# Patient Record
Sex: FEMALE | Race: WHITE | Employment: FULL TIME | URBAN - METROPOLITAN AREA
[De-identification: names, ages, dates, MRNs, and addresses within clinical notes are randomized per-mention and may not be internally consistent; named-entity substitution may affect disease eponyms.]

---

## 2018-05-30 PROBLEM — R00.0 TACHYCARDIA: Status: ACTIVE | Noted: 2018-05-30

## 2018-05-30 PROBLEM — R06.09 DYSPNEA ON EXERTION: Status: ACTIVE | Noted: 2018-05-30

## 2018-05-30 PROBLEM — R07.2 PRECORDIAL PAIN: Status: ACTIVE | Noted: 2018-05-30

## 2018-05-30 PROBLEM — R06.00 DYSPNEA ON EXERTION: Status: ACTIVE | Noted: 2018-05-30

## 2018-06-13 PROCEDURE — 84480 ASSAY TRIIODOTHYRONINE (T3): CPT | Performed by: INTERNAL MEDICINE

## 2018-06-27 PROBLEM — I47.11 INAPPROPRIATE SINUS NODE TACHYCARDIA (HCC): Status: ACTIVE | Noted: 2018-06-27

## 2018-06-27 PROBLEM — R00.0 INAPPROPRIATE SINUS NODE TACHYCARDIA: Status: ACTIVE | Noted: 2018-06-27

## 2018-10-09 PROBLEM — N89.0 VAIN I (VAGINAL INTRAEPITHELIAL NEOPLASIA GRADE I): Status: ACTIVE | Noted: 2018-10-09

## 2021-10-12 ENCOUNTER — HOSPITAL ENCOUNTER (EMERGENCY)
Age: 31
Discharge: HOME | End: 2021-10-12
Attending: EMERGENCY MEDICINE
Payer: COMMERCIAL

## 2021-10-12 ENCOUNTER — APPOINTMENT (OUTPATIENT)
Dept: CT IMAGING | Age: 31
End: 2021-10-12
Attending: EMERGENCY MEDICINE
Payer: COMMERCIAL

## 2021-10-12 VITALS
HEART RATE: 76 BPM | HEIGHT: 66 IN | BODY MASS INDEX: 22.5 KG/M2 | OXYGEN SATURATION: 100 % | WEIGHT: 140 LBS | RESPIRATION RATE: 16 BRPM | TEMPERATURE: 98 F | DIASTOLIC BLOOD PRESSURE: 58 MMHG | SYSTOLIC BLOOD PRESSURE: 118 MMHG

## 2021-10-12 DIAGNOSIS — H53.9 VISUAL DISTURBANCE: Primary | ICD-10-CM

## 2021-10-12 DIAGNOSIS — R51.9 ACUTE NONINTRACTABLE HEADACHE, UNSPECIFIED HEADACHE TYPE: ICD-10-CM

## 2021-10-12 PROCEDURE — 99284 EMERGENCY DEPT VISIT MOD MDM: CPT

## 2021-10-12 PROCEDURE — 80053 COMPREHEN METABOLIC PANEL: CPT

## 2021-10-12 PROCEDURE — 80053 COMPREHEN METABOLIC PANEL: CPT | Performed by: EMERGENCY MEDICINE

## 2021-10-12 PROCEDURE — 85025 COMPLETE CBC W/AUTO DIFF WBC: CPT

## 2021-10-12 PROCEDURE — 70498 CT ANGIOGRAPHY NECK: CPT | Performed by: EMERGENCY MEDICINE

## 2021-10-12 PROCEDURE — 70496 CT ANGIOGRAPHY HEAD: CPT | Performed by: EMERGENCY MEDICINE

## 2021-10-12 PROCEDURE — 85025 COMPLETE CBC W/AUTO DIFF WBC: CPT | Performed by: EMERGENCY MEDICINE

## 2021-10-12 PROCEDURE — 36415 COLL VENOUS BLD VENIPUNCTURE: CPT

## 2021-10-12 NOTE — ED INITIAL ASSESSMENT (HPI)
At work, stood up, felt like passing out, not feeling right, vision was off, noticed \"[place] that was distorted\" on right eye, couldn't see man standing right next to her with right eye, couldn't see out of right eye for 2-3 minutes, b/p was 164/95, now

## 2021-10-12 NOTE — ED PROVIDER NOTES
Patient Seen in: THE [facility] Emergency Department In [place]      History   Patient presents with:  Dizziness  Eye Visual Problem    Stated Complaint: dizzy, lost vision R eye 1130 for 3 min, now just dizzy    Subjective:   HPI    25-year-old female presen Social History    Tobacco Use      Smoking status: Never Smoker      Smokeless tobacco: Never Used    Vaping Use      Vaping Use: Never used    Alcohol use: Yes      Comment: Soc    Drug use:  No             Review of Systems    Positive for stated co CBC W/ DIFFERENTIAL[[id number]]                              Final result                 Please view results for these tests on the individual orders.    RAINBOW DRAW LAVENDER   RAINBOW DRAW LIGHT GREEN   RAINBOW DRAW GOLD   CBC W/ DIFFERENT involving the supraclinoid segments of the internal carotid arteries, greater than expected for the patient's age. An  anterior communicating artery is seen. The branches of the anterior cerebral and middle cerebral arteries are unremarkable.   A small ri 10/12/2021 at 3:49 PM     Finalized by (CST): Rene Bailey MD on 10/12/2021 at 4:02 PM              MDM      69-year-old female presents after an episode of visual disturbance that occurred after standing up and was preceded by lightheadedness.   After

## 2021-10-13 ENCOUNTER — TELEPHONE (OUTPATIENT)
Dept: NEUROLOGY | Facility: CLINIC | Age: 31
End: 2021-10-13

## 2021-10-13 NOTE — TELEPHONE ENCOUNTER
TIA CLINIC SCREENING    1. Date of ED visit/TIA diagnosis:  10/12/2021   Time of discharge from ED:  1652    2. Is patient currently admitted? No   If YES - [facility] Appointment not required.       3. Does patient already see an [facility] neurologist?  No  Nam

## 2021-10-15 ENCOUNTER — LAB ENCOUNTER (OUTPATIENT)
Dept: LAB | Age: 31
End: 2021-10-15
Attending: Other
Payer: COMMERCIAL

## 2021-10-15 ENCOUNTER — OFFICE VISIT (OUTPATIENT)
Dept: NEUROLOGY | Facility: CLINIC | Age: 31
End: 2021-10-15
Payer: COMMERCIAL

## 2021-10-15 VITALS
BODY MASS INDEX: 23 KG/M2 | RESPIRATION RATE: 16 BRPM | DIASTOLIC BLOOD PRESSURE: 68 MMHG | SYSTOLIC BLOOD PRESSURE: 112 MMHG | WEIGHT: 145 LBS | HEART RATE: 68 BPM

## 2021-10-15 DIAGNOSIS — G43.109 OCULAR MIGRAINE: ICD-10-CM

## 2021-10-15 DIAGNOSIS — R93.89 ABNORMAL COMPUTED TOMOGRAPHY ANGIOGRAPHY (CTA): ICD-10-CM

## 2021-10-15 DIAGNOSIS — H53.9 VISUAL DISTURBANCE: ICD-10-CM

## 2021-10-15 PROCEDURE — 86038 ANTINUCLEAR ANTIBODIES: CPT | Performed by: OTHER

## 2021-10-15 PROCEDURE — 3074F SYST BP LT 130 MM HG: CPT | Performed by: OTHER

## 2021-10-15 PROCEDURE — 3078F DIAST BP <80 MM HG: CPT | Performed by: OTHER

## 2021-10-15 PROCEDURE — 99244 OFF/OP CNSLTJ NEW/EST MOD 40: CPT | Performed by: OTHER

## 2021-10-15 PROCEDURE — 85652 RBC SED RATE AUTOMATED: CPT | Performed by: OTHER

## 2021-10-15 RX ORDER — IBUPROFEN 200 MG
200 TABLET ORAL AS NEEDED
COMMUNITY

## 2021-10-15 NOTE — PATIENT INSTRUCTIONS
MIGRAINE & DIET  TYPICAL Migraine FEATURES:  · Prodrome: A sign that a migraine in on the way in 10-30 minutes.  Signs include flashing lights, mental haziness, lack of concentration, irritability, excessive yawning, food craving, nausea, weird smel Suspect birth control (BCP), hormone treatments & mood changing pills. WARNING: Women taking BCPs who have migraine with aura (<1x/mo) have a stroke risk 2 times greater than women without migraine. Women with migraine > 1x/wk are at a 4x's greater risk. on an empty stomach: it will increase insulin release to decrease blood sugar & could cause HA.    · Possibly Copper is a factor: Acidic fruit / juices increase copper absorption: oranges, brielle, limes, grapes, raisins, grapefruit, plums, melons & uncooked Evaluate your life to pin-point stressors and ruthlessly eliminate the ones you can  · Stay HYDRATED: Drink 8oz of water with each meal or 64 oz water/day — it cushions the effect of triggers. Hot water is OK.    · Avoid Sudden Withdrawls from prolonged [place] you care, patients receiving routine medications need to be seen at least once a year. Patients receiving narcotic/controlled substance medications need to be seen at least once every 3 months.   · In the event that your preferred pharmacy does not have th insurance carrier gives the disclaimer that \"Although the procedure is authorized, this does not guarantee payment. \"    Ultimately the patient is responsible for payment. Thank you for your understanding in the matter.   Paperwork Completion:  • If you

## 2021-10-15 NOTE — PROGRESS NOTES
HPI:    Patient ID: Everette Aase is a 27year old female.   PCP: Dr Tanner Cheney  Referring provider: Dr Aliza Nguyen is a 43-year-old female with no significant past medical history who presented for evaluation of an episode of right visual dis pancreatic   • Heart Attack Paternal Grandfather    • High Blood Pressure Paternal Grandfather    • Breast Cancer Maternal Grandmother    • Thyroid Disorder Maternal Grandmother    • Osteoporosis Maternal Grandmother    • High Cholesterol Maternal G membrane  Neck: Normal range of motion. Neck supple. Cardiovascular: Normal rate, regular rhythm and normal heart sounds. Pulmonary/Chest: Effort normal and breath sounds normal.   Abdominal: Soft.  Bowel sounds are normal.     Neurological  Mental Stat Ophthalmology evaluation for retinal examination  Repeat MRA head to assess mild supraclinoid carotid calcified atherosclerosis reported on CTA, could be artifactual and unexpected for patient's age  Maintain a event/headache diary.  Counseled on triggers a

## 2021-10-15 NOTE — PROGRESS NOTES
Patient was in the ED on 10/12/2021. Patient states headaches on and off in the occipital and frontal region. Patient states difficulty with vision focusing. Patient states increase in dizziness. Denies changes in speech or memory. Denies balance changes.

## 2021-10-18 ENCOUNTER — ORDER TRANSCRIPTION (OUTPATIENT)
Dept: ADMINISTRATIVE | Facility: HOSPITAL | Age: 31
End: 2021-10-18

## 2021-10-18 DIAGNOSIS — Z01.812 ENCOUNTER FOR PREPROCEDURE SCREENING LABORATORY TESTING FOR COVID-19: Primary | ICD-10-CM

## 2021-10-18 DIAGNOSIS — Z20.822 ENCOUNTER FOR PREPROCEDURE SCREENING LABORATORY TESTING FOR COVID-19: Primary | ICD-10-CM

## 2021-10-26 RX ORDER — SODIUM CHLORIDE 9 MG/ML
INJECTION, SOLUTION INTRAVENOUS CONTINUOUS
Status: CANCELLED | OUTPATIENT
Start: 2021-10-26

## 2021-10-30 ENCOUNTER — LAB ENCOUNTER (OUTPATIENT)
Dept: LAB | Age: 31
End: 2021-10-30
Attending: Other
Payer: COMMERCIAL

## 2021-10-30 DIAGNOSIS — Z20.822 ENCOUNTER FOR PREPROCEDURE SCREENING LABORATORY TESTING FOR COVID-19: ICD-10-CM

## 2021-10-30 DIAGNOSIS — Z01.812 ENCOUNTER FOR PREPROCEDURE SCREENING LABORATORY TESTING FOR COVID-19: ICD-10-CM

## 2021-11-02 ENCOUNTER — ANESTHESIA (OUTPATIENT)
Dept: MRI IMAGING | Facility: HOSPITAL | Age: 31
End: 2021-11-02
Payer: COMMERCIAL

## 2021-11-02 ENCOUNTER — ANESTHESIA EVENT (OUTPATIENT)
Dept: MRI IMAGING | Facility: HOSPITAL | Age: 31
End: 2021-11-02
Payer: COMMERCIAL

## 2021-11-02 ENCOUNTER — HOSPITAL ENCOUNTER (OUTPATIENT)
Dept: MRI IMAGING | Facility: HOSPITAL | Age: 31
Discharge: HOME | End: 2021-11-02
Attending: Other
Payer: COMMERCIAL

## 2021-11-02 VITALS
BODY MASS INDEX: 23.3 KG/M2 | HEIGHT: 66 IN | RESPIRATION RATE: 18 BRPM | OXYGEN SATURATION: 98 % | HEART RATE: 73 BPM | SYSTOLIC BLOOD PRESSURE: 115 MMHG | DIASTOLIC BLOOD PRESSURE: 70 MMHG | WEIGHT: 145 LBS | TEMPERATURE: 98 F

## 2021-11-02 DIAGNOSIS — R93.89 ABNORMAL COMPUTED TOMOGRAPHY ANGIOGRAPHY (CTA): ICD-10-CM

## 2021-11-02 PROCEDURE — A9575 INJ GADOTERATE MEGLUMI 0.1ML: HCPCS | Performed by: OTHER

## 2021-11-02 PROCEDURE — 70546 MR ANGIOGRAPH HEAD W/O&W/DYE: CPT | Performed by: OTHER

## 2021-11-02 RX ORDER — ONDANSETRON 2 MG/ML
4 INJECTION INTRAMUSCULAR; INTRAVENOUS AS NEEDED
Status: CANCELLED | OUTPATIENT
Start: 2021-11-02 | End: 2021-11-02

## 2021-11-02 RX ORDER — HYDROCODONE BITARTRATE AND ACETAMINOPHEN 5; 325 MG/1; MG/1
2 TABLET ORAL AS NEEDED
Status: CANCELLED | OUTPATIENT
Start: 2021-11-02

## 2021-11-02 RX ORDER — HYDROCODONE BITARTRATE AND ACETAMINOPHEN 5; 325 MG/1; MG/1
1 TABLET ORAL AS NEEDED
Status: CANCELLED | OUTPATIENT
Start: 2021-11-02

## 2021-11-02 RX ORDER — MIDAZOLAM HYDROCHLORIDE 1 MG/ML
INJECTION INTRAMUSCULAR; INTRAVENOUS AS NEEDED
Status: DISCONTINUED | OUTPATIENT
Start: 2021-11-02 | End: 2021-11-02 | Stop reason: SURG

## 2021-11-02 RX ORDER — KETAMINE HYDROCHLORIDE 50 MG/ML
INJECTION, SOLUTION, CONCENTRATE INTRAMUSCULAR; INTRAVENOUS AS NEEDED
Status: DISCONTINUED | OUTPATIENT
Start: 2021-11-02 | End: 2021-11-02 | Stop reason: SURG

## 2021-11-02 RX ORDER — SODIUM CHLORIDE 9 MG/ML
INJECTION, SOLUTION INTRAVENOUS CONTINUOUS PRN
Status: DISCONTINUED | OUTPATIENT
Start: 2021-11-02 | End: 2021-11-02 | Stop reason: SURG

## 2021-11-02 RX ORDER — NALOXONE HYDROCHLORIDE 0.4 MG/ML
80 INJECTION, SOLUTION INTRAMUSCULAR; INTRAVENOUS; SUBCUTANEOUS AS NEEDED
Status: CANCELLED | OUTPATIENT
Start: 2021-11-02 | End: 2021-11-02

## 2021-11-02 RX ORDER — SODIUM CHLORIDE, SODIUM LACTATE, POTASSIUM CHLORIDE, CALCIUM CHLORIDE 600; 310; 30; 20 MG/100ML; MG/100ML; MG/100ML; MG/100ML
INJECTION, SOLUTION INTRAVENOUS CONTINUOUS
Status: CANCELLED | OUTPATIENT
Start: 2021-11-02

## 2021-11-02 RX ADMIN — MIDAZOLAM HYDROCHLORIDE 2 MG: 1 INJECTION INTRAMUSCULAR; INTRAVENOUS at 14:32:00

## 2021-11-02 RX ADMIN — SODIUM CHLORIDE: 9 INJECTION, SOLUTION INTRAVENOUS at 14:30:00

## 2021-11-02 RX ADMIN — KETAMINE HYDROCHLORIDE 25 MG: 50 INJECTION, SOLUTION, CONCENTRATE INTRAMUSCULAR; INTRAVENOUS at 14:32:00

## 2021-11-02 NOTE — ANESTHESIA POSTPROCEDURE EVALUATION
Antonia Lindsey 89 Patient Status:  Outpatient   Age/Gender 27year old female MRN [id number]   [facility] MRI Attending Allie Dsouza MD   Hosp Day # 0 [facility] 40 [facility]       Anesthesia Post-op Note        Procedure S

## 2021-11-02 NOTE — ANESTHESIA PREPROCEDURE EVALUATION
PRE-OP EVALUATION    Patient Name: Marin Kirby    Admit Diagnosis: Abnormal computed tomography angiography (CTA) [R93.89]    Pre-op Diagnosis: * No surgery found *        Anesthesia Procedure: MRA, HEAD (W+WO) (CPT=70546)    * Surgery not found * 10/12/2021    CREATSERUM 0.67 10/12/2021    GLU 80 10/12/2021    CA 9.1 10/12/2021            Airway      Mallampati: II  Mouth opening: >3 FB  TM distance: > 6 cm  Neck ROM: full Cardiovascular    Cardiovascular exam normal.         Dental    No notable d

## 2023-07-27 ENCOUNTER — APPOINTMENT (OUTPATIENT)
Dept: URBAN - METROPOLITAN AREA CLINIC 247 | Age: 33
Setting detail: DERMATOLOGY
End: 2023-07-27

## 2023-07-27 DIAGNOSIS — D18.0 HEMANGIOMA: ICD-10-CM

## 2023-07-27 DIAGNOSIS — L70.0 ACNE VULGARIS: ICD-10-CM

## 2023-07-27 DIAGNOSIS — L81.4 OTHER MELANIN HYPERPIGMENTATION: ICD-10-CM

## 2023-07-27 DIAGNOSIS — D22 MELANOCYTIC NEVI: ICD-10-CM

## 2023-07-27 DIAGNOSIS — L72.0 EPIDERMAL CYST: ICD-10-CM

## 2023-07-27 PROBLEM — D18.01 HEMANGIOMA OF SKIN AND SUBCUTANEOUS TISSUE: Status: ACTIVE | Noted: 2023-07-27

## 2023-07-27 PROBLEM — D22.5 MELANOCYTIC NEVI OF TRUNK: Status: ACTIVE | Noted: 2023-07-27

## 2023-07-27 PROCEDURE — OTHER PRESCRIPTION MEDICATION MANAGEMENT: OTHER

## 2023-07-27 PROCEDURE — OTHER PRESCRIPTION: OTHER

## 2023-07-27 PROCEDURE — OTHER COUNSELING: OTHER

## 2023-07-27 PROCEDURE — 99204 OFFICE O/P NEW MOD 45 MIN: CPT

## 2023-07-27 PROCEDURE — OTHER MIPS QUALITY: OTHER

## 2023-07-27 RX ORDER — TRETIONIN 0.25 MG/G
CREAM TOPICAL QHS
Qty: 20 | Refills: 5 | Status: ERX | COMMUNITY
Start: 2023-07-27

## 2023-07-27 ASSESSMENT — LOCATION ZONE DERM
LOCATION ZONE: TRUNK
LOCATION ZONE: FACE

## 2023-07-27 ASSESSMENT — LOCATION SIMPLE DESCRIPTION DERM
LOCATION SIMPLE: LEFT CHEEK
LOCATION SIMPLE: LEFT UPPER BACK
LOCATION SIMPLE: LEFT FOREHEAD
LOCATION SIMPLE: RIGHT UPPER BACK
LOCATION SIMPLE: ABDOMEN

## 2023-07-27 ASSESSMENT — LOCATION DETAILED DESCRIPTION DERM
LOCATION DETAILED: LEFT MEDIAL FOREHEAD
LOCATION DETAILED: PERIUMBILICAL SKIN
LOCATION DETAILED: RIGHT INFERIOR UPPER BACK
LOCATION DETAILED: RIGHT MID-UPPER BACK
LOCATION DETAILED: LEFT INFERIOR CENTRAL MALAR CHEEK
LOCATION DETAILED: LEFT SUPERIOR UPPER BACK

## 2023-07-27 NOTE — PROCEDURE: COUNSELING
Birth Control Pills Counseling: Birth Control Pill Counseling: I discussed with the patient the potential side effects of OCPs including but not limited to increased risk of stroke, heart attack, thrombophlebitis, deep venous thrombosis, hepatic adenomas, breast changes, GI upset, headaches, and depression.  The patient verbalized understanding of the proper use and possible adverse effects of OCPs. All of the patient's questions and concerns were addressed.
Erythromycin Pregnancy And Lactation Text: This medication is Pregnancy Category B and is considered safe during pregnancy. It is also excreted in breast milk.
Topical Retinoid Pregnancy And Lactation Text: This medication is Pregnancy Category C. It is unknown if this medication is excreted in breast milk.
Include Pregnancy/Lactation Warning?: No
Azelaic Acid Pregnancy And Lactation Text: This medication is considered safe during pregnancy and breast feeding.
Bactrim Counseling:  I discussed with the patient the risks of sulfa antibiotics including but not limited to GI upset, allergic reaction, drug rash, diarrhea, dizziness, photosensitivity, and yeast infections.  Rarely, more serious reactions can occur including but not limited to aplastic anemia, agranulocytosis, methemoglobinemia, blood dyscrasias, liver or kidney failure, lung infiltrates or desquamative/blistering drug rashes.
Sarecycline Counseling: Patient advised regarding possible photosensitivity and discoloration of the teeth, skin, lips, tongue and gums.  Patient instructed to avoid sunlight, if possible.  When exposed to sunlight, patients should wear protective clothing, sunglasses, and sunscreen.  The patient was instructed to call the office immediately if the following severe adverse effects occur:  hearing changes, easy bruising/bleeding, severe headache, or vision changes.  The patient verbalized understanding of the proper use and possible adverse effects of sarecycline.  All of the patient's questions and concerns were addressed.
Aklief Pregnancy And Lactation Text: It is unknown if this medication is safe to use during pregnancy.  It is unknown if this medication is excreted in breast milk.  Breastfeeding women should use the topical cream on the smallest area of the skin for the shortest time needed while breastfeeding.  Do not apply to nipple and areola.
Doxycycline Pregnancy And Lactation Text: This medication is Pregnancy Category D and not consider safe during pregnancy. It is also excreted in breast milk but is considered safe for shorter treatment courses.
Minocycline Counseling: Patient advised regarding possible photosensitivity and discoloration of the teeth, skin, lips, tongue and gums.  Patient instructed to avoid sunlight, if possible.  When exposed to sunlight, patients should wear protective clothing, sunglasses, and sunscreen.  The patient was instructed to call the office immediately if the following severe adverse effects occur:  hearing changes, easy bruising/bleeding, severe headache, or vision changes.  The patient verbalized understanding of the proper use and possible adverse effects of minocycline.  All of the patient's questions and concerns were addressed.
Tazorac Pregnancy And Lactation Text: This medication is not safe during pregnancy. It is unknown if this medication is excreted in breast milk.
Detail Level: Simple
Dapsone Pregnancy And Lactation Text: This medication is Pregnancy Category C and is not considered safe during pregnancy or breast feeding.
Azithromycin Counseling:  I discussed with the patient the risks of azithromycin including but not limited to GI upset, allergic reaction, drug rash, diarrhea, and yeast infections.
Topical Clindamycin Pregnancy And Lactation Text: This medication is Pregnancy Category B and is considered safe during pregnancy. It is unknown if it is excreted in breast milk.
Tetracycline Pregnancy And Lactation Text: This medication is Pregnancy Category D and not consider safe during pregnancy. It is also excreted in breast milk.
High Dose Vitamin A Counseling: Side effects reviewed, pt to contact office should one occur.
Topical Sulfur Applications Pregnancy And Lactation Text: This medication is Pregnancy Category C and has an unknown safety profile during pregnancy. It is unknown if this topical medication is excreted in breast milk.
Isotretinoin Counseling: Patient should get monthly blood tests, not donate blood, not drive at night if vision affected, not share medication, and not undergo elective surgery for 6 months after tx completed. Side effects reviewed, pt to contact office should one occur.
Spironolactone Pregnancy And Lactation Text: This medication can cause feminization of the male fetus and should be avoided during pregnancy. The active metabolite is also found in breast milk.
Birth Control Pills Pregnancy And Lactation Text: This medication should be avoided if pregnant and for the first 30 days post-partum.
Bactrim Pregnancy And Lactation Text: This medication is Pregnancy Category D and is known to cause fetal risk.  It is also excreted in breast milk.
Winlevi Pregnancy And Lactation Text: This medication is considered safe during pregnancy and breastfeeding.
Topical Retinoid counseling:  Patient advised to apply a pea-sized amount only at bedtime and wait 30 minutes after washing their face before applying.  If too drying, patient may add a non-comedogenic moisturizer. The patient verbalized understanding of the proper use and possible adverse effects of retinoids.  All of the patient's questions and concerns were addressed.
Benzoyl Peroxide Counseling: Patient counseled that medicine may cause skin irritation and bleach clothing.  In the event of skin irritation, the patient was advised to reduce the amount of the drug applied or use it less frequently.   The patient verbalized understanding of the proper use and possible adverse effects of benzoyl peroxide.  All of the patient's questions and concerns were addressed.
Erythromycin Counseling:  I discussed with the patient the risks of erythromycin including but not limited to GI upset, allergic reaction, drug rash, diarrhea, increase in liver enzymes, and yeast infections.
Tazorac Counseling:  Patient advised that medication is irritating and drying.  Patient may need to apply sparingly and wash off after an hour before eventually leaving it on overnight.  The patient verbalized understanding of the proper use and possible adverse effects of tazorac.  All of the patient's questions and concerns were addressed.
Azelaic Acid Counseling: Patient counseled that medicine may cause skin irritation and to avoid applying near the eyes.  In the event of skin irritation, the patient was advised to reduce the amount of the drug applied or use it less frequently.   The patient verbalized understanding of the proper use and possible adverse effects of azelaic acid.  All of the patient's questions and concerns were addressed.
Azithromycin Pregnancy And Lactation Text: This medication is considered safe during pregnancy and is also secreted in breast milk.
Aklief counseling:  Patient advised to apply a pea-sized amount only at bedtime and wait 30 minutes after washing their face before applying.  If too drying, patient may add a non-comedogenic moisturizer.  The most commonly reported side effects including irritation, redness, scaling, dryness, stinging, burning, itching, and increased risk of sunburn.  The patient verbalized understanding of the proper use and possible adverse effects of retinoids.  All of the patient's questions and concerns were addressed.
Topical Clindamycin Counseling: Patient counseled that this medication may cause skin irritation or allergic reactions.  In the event of skin irritation, the patient was advised to reduce the amount of the drug applied or use it less frequently.   The patient verbalized understanding of the proper use and possible adverse effects of clindamycin.  All of the patient's questions and concerns were addressed.
Doxycycline Counseling:  Patient counseled regarding possible photosensitivity and increased risk for sunburn.  Patient instructed to avoid sunlight, if possible.  When exposed to sunlight, patients should wear protective clothing, sunglasses, and sunscreen.  The patient was instructed to call the office immediately if the following severe adverse effects occur:  hearing changes, easy bruising/bleeding, severe headache, or vision changes.  The patient verbalized understanding of the proper use and possible adverse effects of doxycycline.  All of the patient's questions and concerns were addressed.
High Dose Vitamin A Pregnancy And Lactation Text: High dose vitamin A therapy is contraindicated during pregnancy and breast feeding.
Dapsone Counseling: I discussed with the patient the risks of dapsone including but not limited to hemolytic anemia, agranulocytosis, rashes, methemoglobinemia, kidney failure, peripheral neuropathy, headaches, GI upset, and liver toxicity.  Patients who start dapsone require monitoring including baseline LFTs and weekly CBCs for the first month, then every month thereafter.  The patient verbalized understanding of the proper use and possible adverse effects of dapsone.  All of the patient's questions and concerns were addressed.
Topical Sulfur Applications Counseling: Topical Sulfur Counseling: Patient counseled that this medication may cause skin irritation or allergic reactions.  In the event of skin irritation, the patient was advised to reduce the amount of the drug applied or use it less frequently.   The patient verbalized understanding of the proper use and possible adverse effects of topical sulfur application.  All of the patient's questions and concerns were addressed.
Tetracycline Counseling: Patient counseled regarding possible photosensitivity and increased risk for sunburn.  Patient instructed to avoid sunlight, if possible.  When exposed to sunlight, patients should wear protective clothing, sunglasses, and sunscreen.  The patient was instructed to call the office immediately if the following severe adverse effects occur:  hearing changes, easy bruising/bleeding, severe headache, or vision changes.  The patient verbalized understanding of the proper use and possible adverse effects of tetracycline.  All of the patient's questions and concerns were addressed. Patient understands to avoid pregnancy while on therapy due to potential birth defects.
Spironolactone Counseling: Patient advised regarding risks of diarrhea, abdominal pain, hyperkalemia, birth defects (for female patients), liver toxicity and renal toxicity. The patient may need blood work to monitor liver and kidney function and potassium levels while on therapy. The patient verbalized understanding of the proper use and possible adverse effects of spironolactone.  All of the patient's questions and concerns were addressed.
Benzoyl Peroxide Pregnancy And Lactation Text: This medication is Pregnancy Category C. It is unknown if benzoyl peroxide is excreted in breast milk.
Isotretinoin Pregnancy And Lactation Text: This medication is Pregnancy Category X and is considered extremely dangerous during pregnancy. It is unknown if it is excreted in breast milk.
Winlevi Counseling:  I discussed with the patient the risks of topical clascoterone including but not limited to erythema, scaling, itching, and stinging. Patient voiced their understanding.
Detail Level: Zone

## 2025-01-16 ENCOUNTER — OFFICE VISIT (OUTPATIENT)
Facility: CLINIC | Age: 35
End: 2025-01-16
Payer: COMMERCIAL

## 2025-01-16 VITALS
WEIGHT: 151 LBS | BODY MASS INDEX: 24.27 KG/M2 | SYSTOLIC BLOOD PRESSURE: 122 MMHG | HEIGHT: 66 IN | DIASTOLIC BLOOD PRESSURE: 92 MMHG

## 2025-01-16 DIAGNOSIS — Z31.69 ENCOUNTER FOR PRECONCEPTION CONSULTATION: Primary | ICD-10-CM

## 2025-01-16 PROCEDURE — 3080F DIAST BP >= 90 MM HG: CPT | Performed by: OBSTETRICS & GYNECOLOGY

## 2025-01-16 PROCEDURE — 3074F SYST BP LT 130 MM HG: CPT | Performed by: OBSTETRICS & GYNECOLOGY

## 2025-01-16 PROCEDURE — 99202 OFFICE O/P NEW SF 15 MIN: CPT | Performed by: OBSTETRICS & GYNECOLOGY

## 2025-01-16 PROCEDURE — 3008F BODY MASS INDEX DOCD: CPT | Performed by: OBSTETRICS & GYNECOLOGY

## 2025-01-16 RX ORDER — MULTIVIT,IRON,MINERALS/LUTEIN
TABLET ORAL
COMMUNITY

## 2025-01-16 NOTE — PROGRESS NOTES
GYN H&P     Genetic questionnaire reviewed with the patient and she will be referred for genetic counseling if the questionnaire had any positive results.    The [facility] intake form was also reviewed regarding contraception, menstrual periods, urinary health, and vaginal / sexual health    2025  4:58 PM    Chief Complaint   Patient presents with    Pregnancy     Patient is trying to conceive , has been unsuccessful        HPI: Mabel is a 34 year old  Patient's last menstrual period was 2024 (approximate).  (contraception: trying to conceive ) here for her annual gyn exam.     She has no complaints.   Menses are regular. Denies any pelvic or breast complaints.   Satisfied with current contraception.         Previous encounters and chart reviewed.     OB History    Para Term  AB Living   0 0 0 0 0 0   SAB IAB Ectopic Multiple Live Births   0 0 0 0         GYN hx:   Menarche: 17  Period Cycle (Days): 25-29  Period Duration (Days): 5-7  Use of Birth Control (if yes, specify type): None  Pap Date: 23  Pap Result Notes: 2023 neg/neg  Follow Up Recommendation: due       Past Medical History:    Arrhythmia    tachycardia    Pap smear abnormality of cervix/human papillomavirus (HPV) positive    Sinus tachycardia    Visual impairment    contacts     Past Surgical History:   Procedure Laterality Date    Breast biopsy  3/31/16    benign    Colposcopy,bx cervix/endocerv curr  2018    Colposcopy,bx cervix/endocerv curr  2019    NL     Allergies[1]  Medications Ordered Prior to Encounter[2]  Family History   Problem Relation Age of Onset    Prostate Cancer Father     Cancer Paternal Grandfather         pancreatic    Heart Attack Paternal Grandfather     High Blood Pressure Paternal Grandfather     Breast Cancer Maternal Grandmother     Thyroid Disorder Maternal Grandmother     Osteoporosis Maternal Grandmother     High Cholesterol Maternal Grandmother      Heart Disorder Maternal Grandfather     Heart Attack Maternal Grandfather     High Cholesterol Maternal Grandfather     High Cholesterol Paternal Grandmother      Social History     Socioeconomic History    Marital status:      Spouse name: Not on file    Number of children: Not on file    Years of education: Not on file    Highest education level: Not on file   Occupational History    Not on file   Tobacco Use    Smoking status: Never    Smokeless tobacco: Never   Vaping Use    Vaping status: Never Used   Substance and Sexual Activity    Alcohol use: Yes     Comment: Soc    Drug use: No    Sexual activity: Yes     Partners: Male   Other Topics Concern     Service Not Asked    Blood Transfusions Not Asked    Caffeine Concern Yes     Comment: coffee daily    Occupational Exposure Not Asked    Hobby Hazards Not Asked    Sleep Concern Not Asked    Stress Concern Not Asked    Weight Concern Not Asked    Special Diet Not Asked    Back Care Not Asked    Exercise Yes     Comment: walking and stretching    Bike Helmet Not Asked    Seat Belt Not Asked    Self-Exams Not Asked   Social History Narrative    Not on file     Social Drivers of Health     Financial Resource Strain: Not on file   Food Insecurity: Not on file   Transportation Needs: Not on file   Physical Activity: Not on file   Stress: Not on file   Social Connections: Not on file   Housing Stability: Not on file       ROS:    Review of Systems:  General: denies fevers, chills, fatigue and malaise.   Eyes: no visual changes, denies headaches  ENT: no complaints, denies earaches, runny nose, epistaxis, throat pain or sore throat  Respiratory: denies SOB, dyspnea, cough or wheezing  Cardiovascular: denies chest pain, palpitations, exercise intolerance   GI: denies abdominal pain, diarrhea, constipation  : no complaints, denies dysuria, increased urinary frequency. Menses regular, no dysmenorrhea, no menorrhagia, no dyspareunia    Hematological/lymphatic: denies history of excessive bleeding or bruising, denies dizziness, lightheadedness.   Breast: denies rashes, skin changes, pain, lumps or discharge   Psychiatric: denies depression, changes in sleep patterns, anxiety  Endocrine: denies hot or cold intolerance, mood changes   Neurological: denies changes in sight, smell, hearing or taste. Denies seizures or tremors  Immunological: denies allergies, denies anaphylaxis, or swollen lymph nodes  Musculoskeletal: denies joint pain, morning stiffness, decreased range of motion         O BP (!) 122/92   Ht 66\"   Wt 151 lb (68.5 kg)   LMP 12/22/2024 (Approximate)   BMI 24.37 kg/m²         Wt Readings from Last 6 Encounters:   01/16/25 151 lb (68.5 kg)   10/26/21 145 lb (65.8 kg)   10/15/21 145 lb (65.8 kg)   10/12/21 140 lb (63.5 kg)   01/23/20 146 lb 9.6 oz (66.5 kg)   11/27/19 148 lb 8 oz (67.4 kg)     Exam:   GENERAL: well developed, well nourished, in no apparent distress, oriented.        A/P: Patient is 34 year old female with no complaints. Here for well woman exam.     Patient counseled on:    Diet/exercise.      Self Breast Exams     Safe sex practices / and living environment     Vaccines:  Annual Flu, Tdap +/- Gardasil not recommended (up to 45 yrs).      Pneumococcal at 65 yrs old, Shingles at 60 yrs old          Pap: done  GC/Chlamydia:  na  Mammogram:  na   Dexa:  na  Colonoscopy / Cologuard:   na  Lipid / Cholesterol:           omponent  Ref Range & Units 12/14/23  3:05 PM   Total Cholesterol  0 - 199 mg/dL 149   Triglycerides  0.00 - 150.00 mg/dL 77   Comment: NCEP Reference Values for Triglycerides:  Normal:             <150 mg/dL  Borderline High:    150 - 199 mg/dL  High:               200 - 499 mg/dL  Very High:          >/= 500 mg/dL   HDL Cholesterol  >40 mg/dL 69   LDL Cholesterol  0 - 99 mg/dL 64   Comment: Cutoff values recommended by the National Cholesterol Education Program:  DESIRABLE:    Cholesterol <200 mg/dL            LDL <100 mg/dL  BORDERLINE:   Cholesterol 200-239 mg/dL        -159 mg/dL  HIGHER RISK:  Cholesterol >240 mg/dL           LDL >160 mg/dL, HDL <40 mg/dL   Non-HDL Cholesterol  No Reference Range mg/dL 80   Comment: A reasonable goal for non-HDL cholesterol is one that is 30 mg/dL higher than the LDL cholesterol goal.   CHOL/HDL Ratio  0.0 - 5.0 2.2   Resulting Agency [facility]   Narrative    Meds This Visit:    Requested Prescriptions      No prescriptions requested or ordered in this encounter       1. Encounter for preconception consultation    SA carito Story    Discussed work up if not pregnant by this summer    Timed intercourse -          Return in about 6 months (around 7/16/2025) for Office Visit.    Abimael Cortez MD   1/16/2025  4:58 PM       This note was created by [employer] voice recognition. Errors in content may be related to improper recognition by the system; efforts to review and correct have been done but errors may still exist. Please contact me with any questions.    Note to patient and family   The 21st Century Cures Act makes medical notes available to patients in the interest of transparency.  However, please be advised that this is a medical document.  It is intended as [id number] communication.  It is written in medical language which may contain abbreviations or verbiage that are technical and unfamiliar.  It may appear blunt or direct.  Medical documents are intended to carry relevant information, facts as evident, and the clinical opinion of the practitioner.           [1]   Allergies  Allergen Reactions    Codeine ITCHING   [2]   Current Outpatient Medications on File Prior to Visit   Medication Sig Dispense Refill    MULTI PRENATAL 27-0.8 MG Oral Tab Take by mouth.      ibuprofen 200 MG Oral Tab Take 200 mg by mouth as needed for Pain.       No current facility-administered medications on file prior to visit.

## 2025-01-17 ENCOUNTER — MED REC SCAN ONLY (OUTPATIENT)
Facility: CLINIC | Age: 35
End: 2025-01-17

## 2025-04-22 ENCOUNTER — TELEPHONE (OUTPATIENT)
Dept: OBGYN CLINIC | Facility: CLINIC | Age: 35
End: 2025-04-22

## 2025-04-22 DIAGNOSIS — N91.2 AMENORRHEA: Primary | ICD-10-CM

## 2025-04-22 NOTE — TELEPHONE ENCOUNTER
Patient calling to initiate prenatal care  LMP 3/1/25  Patient is 7-8 weeks on 4/26/25  Confirmation of pregnancy appointment scheduled on 5/6/25  Insurance [employer] PPO    Any history of ectopic pregnancy? NO  Any history of miscarriage? NO  Any medications that you are taking on a regular basis other than prenatal vitamins? NO (if not taking prenatal vitamins, encourage patient to start taking.)  Any bleeding since the first day of last LMP and your positive pregnancy test? NO     Future Appointments   Date Time Provider Department Center   5/6/2025  3:30 PM [facility] [place]   5/6/2025  4:15 PM Caterina Acosta MD [facility] [place]   5/29/2025  1:30 PM Elizabeth Gentile MD [facility] [address]   6/24/2025  2:15 PM Glen Mae MD [facility] [address]

## 2025-04-22 NOTE — TELEPHONE ENCOUNTER
US order pended to  to sign.  Pt. has US/ appointment. with Dr. Acosta for 5/6/25.    PATIENT IS NOT ESTABLISHED IN OUR PRACTICE.  WILL SEND [facility] WITH PRENATAL INFORMATION

## 2025-04-22 NOTE — TELEPHONE ENCOUNTER
Just FYI patient had made a mistake on her LMP,she called back and corrected it. New LMP is 3/21/2025 and her appts  are rescheduled  Future Appointments   Date Time Provider Department Center   5/16/2025  1:15 PM [facility] US [facility] [address]   5/16/2025  2:00 PM Glen Mae MD [facility] [address]   6/17/2025  3:30 PM Mary Sandoval APRN [facility] [address]

## 2025-05-16 NOTE — PROGRESS NOTES
Subjective:  Patient presents complaining of no menses. Positive home pregnancy test.  LMP 3/21/25 every 26--> 32 days.  No family history of any inheritable diseases or congenital birth defects on either side of family.    Objective:  /70   Pulse 80   Ht 66\"   Wt 149 lb 12.8 oz (67.9 kg)   LMP 03/21/2025 (Exact Date)     Physical Examination:  General appearance: Well dressed, well nourished in no apparent distress  Neurologic/Psychiatric: Alert and oriented to person, place and time, mood normal, affect appropriate    Summary of Ultrasound Findings:  Ultrasound scan performed transabdominal.  LMP 3/21/25  8w0day by LMP;  6w5day by ultrasound  Viable intrauterine pregnancy  Dates not consistent with ultrasound.  Final EDC:  1/4/26 by ultrasound on 5/16/2025 not consistent with LMP  Normal ovaries bilaterally.     Assessment/Plan:  Amenorrhea- Normal dating ultrasound  AMA- counseled.  MFM Level 2.  BASA  Hx of HOLLI 1 and VAIN 1- Pap only 2023.  Recommend check Pap/HPV  Left breast fibroadenoma- recommend check follow up breast ultrasound.  Follow up 3-4 weeks for new OB visit.  Prenatal vitamin with 0.4 mg folate, DHA.  Optional prenatal screening tests reviewed including cfdna, carrier screen/CF, NT/first trimester screen, Quad/AFP, Level 2 ultrasound, amniocentesis/CVS.  Bleeding precautions provided.    Diagnoses and all orders for this visit:    Amenorrhea  -     US PELVIS, ABDOMINAL [facility] ONLY    Fibroadenoma of left breast  -     US BREAST LEFT LIMITED (CPT=76642); Future       Return for New OB Visit.

## 2025-05-16 NOTE — PATIENT INSTRUCTIONS
Medications Safe in Pregnancy    The following over-the-counter medications may be taken safely after 12 weeks gestation by any patient who is pregnant.  Please follow the instructions on the package for adult dosage.  If you experience any symptoms prior to 12 weeks, please call the office at [phone].  (**these are all safe for you to use now. We ask that you use them sparingly in the first trimester and that you call us with any persistent concerns**)     Colds/Congestion: Flonase, Saline Nasal Spray, Neti Pot, Plain Robitussin, Robitussin DM, Mucinex DM, Vicks 44 E, Vicks Vapor rub, Cough drops without Zinc or Sudafed.   Contact your doctor if you have a persistent fever over 100.4, shortness of breath, coughing up green mucus, or a sore throat that persists from more than 3 days     Diarrhea: Imodium, Maalox Anti-Diarrheal or Kaopectate  Contact the office if you have diarrhea for more than 3 days.     Allergies: Benadryl, Claritin or Zyrtec     Hemorrhoids: Tucks pads, Preparation H, Annusol, Sitz bath or Witch hazel  Eat a high fiber diet and drink plenty of fluids.     Yeast: Monistat 3 or 7  Call if your symptoms do not improve, or if you experience vaginal bleeding, or a watery discharge.     Constipation: Metamucil, Colace, fiber supplement, Milk of Magnesia or Dulcolax  Drink plenty of fluids, eat high-fiber foods and take the above laxatives sporadically.      Headache or Mild aches and pain: Extra Strength Tylenol      Gas: Gas X, Gelusil, Papaya Tablets, Maalox, Mylicon or Mylanta Gas     Heartburn: Tums, Mylanta, Pepcid AC or Maalox     Sore throat: Alcohol free Chloraseptic spray or Lozenges      Nausea and Vomiting: ½ tablet Unisom plus 100mg of Vitamin B6 at bedtime (may increase B6 up to 200mg per day), Tg root tea or raspberry leaf tea     Insomnia: Tylenol PM, Vitamin B6 50mg, warm bath or milk, Unisom, Nytol or Sominex.      We have listed a few medications for common symptoms seen  in pregnancy.  Please contact the office if you are unsure about any over the counter medications that are not listed above.       Nausea and vomiting management in pregnancy     1. Half tablet of Unisom and 100 mg of Vitamin B6 at bedtime. Can repeat Vitamin B6 x1 in the morning or during the day  2. Small, frequent meals/snacks- especially important to include good proteins in your evening meal if you are feeling nauseated in the morning. This will keep your stomach full longer and reduce nausea.  3. Ginger tea, ginger ale, ginger chews, peppermint candies, hard candies  4. Keep a snack and or drink at your bedside to have before you even get out of bed in the morning  5. Sea Bands (https://www.seaEventVueband.com/) these can be worn all day/night as needed. The correct spot to wear them is 3 finger widths down from where your wrist bends on the inside     **if you find yourself unable to keep anything down for 24 hours or more, please go to one of the [facility] or the ER so that you can get IV fluids and medications**  Healthy Eating Habits During Pregnancy  It’s important to develop healthy eating habits while you are pregnant, for you as well as for your baby. Here are some ways to stay healthy.   Aim for a healthy weight  A slow, steady rate of weight gain is often best. After the first trimester, you may gain about a pound a week. If you were overweight before pregnancy, you need to gain fewer pounds. Your healthcare provider can give you a healthy weight goal for your pregnancy.   Don’t diet  Now is not the time to diet. You may not get enough of the nutrients you and your baby need. Instead, learn how to be a healthy eater. Start by doing it for your baby. Soon, you may do it for yourself.   Vitamins and supplements  Talk with your healthcare provider about taking these and other prenatal vitamins and supplements.   Iron makes the extra blood you need now.  Calcium and vitamin D help build and  keep strong bones.  Folic acid helps prevent certain birth defects.  Iodine helps the thyroid work right.  Some vitamins may not be safe to take. Your healthcare provider will tell you which ones to avoid.  Fluids  Drink at least 8 to 10 cups of fluid daily. Your baby needs fluids. Fluids also decrease constipation, flush out toxins and waste, limit swelling, and help prevent bladder infections. Water is best. Other good choices are:   Water or seltzer water with a slice of lemon or lime. (These can also help ease an upset stomach.)  Clear soups that are low in salt  Low-fat or fat-free milk, soy or rice milk with calcium added  Popsicles or gelatin  Things to avoid  Some things might harm your growing baby. Don’t eat or drink:   Alcohol  Unpasteurized dairy foods and juices  Raw or undercooked meat, poultry, fish, or eggs  Unwashed fruits and vegetables  Prepared meats, like deli meats or hot dogs, unless heated until steaming hot  Fish that are high in mercury, like shark, swordfish, keyur mackerel, tilefish, and albacore tuna  Things to limit  Ask your healthcare provider whether it’s safe to eat or drink:   Caffeine  Artificial sweeteners  Organ meats  Certain types of fish  Fish and shellfish that contain mercury in lower amounts, like shrimp, canned light tuna, salmon, pollock, and catfish      Foods to Avoid During Pregnancy  Deli Meats- You may eat deli meats from the deli.  If you eat deli meats in the package, it may be contaminated with Listeria. Heat all deli meats in a package to 165 degrees Fahrenheit before eating, even if the label states the meat is “pre-cooked”.    Soft Cheeses and Unpasteurized Products - You may eat soft cheeses that are pasteurized.  Please avoid all soft cheeses, milk or juice that is unpasteurized.  Fish- avoid fish that contains a high level of mercury. These include but are not limited to; Leroy, Orange Roughy, Tile Fish, Shark, Swordfish, and Keyur Mackerel. Please see chart  below for good fish choices in pregnancy. Also avoid any raw, uncooked shellfish such as oysters, clams, or sushi.  Make sure to cook all meats; including ground beef, pork, and poultry to their desired temperatures before consuming. This reduces the chance of a food borne illness.  Caffeine- limit to 200 mg or an 8oz cup daily or less.   Alcohol- no amount of alcohol is determined to be safe in pregnancy. Do not drink any alcoholic beverages while pregnant.            [facility]- Prenatal Testing    The following information is designed to help you understand some of the optional tests that are available to you to screen for problems in your pregnancy.  Before considering any of these tests, you will need to consider the following questions:    [1] What would you do if an abnormality were detected?  If the answer is nothing, then you may decide to decline all testing.  [2] Would you be willing to undergo testing which might increase your risk of miscarriage, such as an amniocentesis or CVS (see below)?  [3] If you have the initial testing, and it indicates that there might be a problem, and you subsequently decide not to do invasive testing such as an amniocentesis, would you worry excessively through the remainder of the pregnancy?    The following tests are available to screen for problems in your pregnancy:    [1]  First Trimester Screening (also called Nuchal Thickness, Nuchal Translucency or NT)- This is an abdominal ultrasound done between 10 and 14 weeks by a perinatology specialist (Maternal Fetal Medicine, MFM) which measures the thickness of the skin behind your baby's neck.  Increased thickness of the skin in this area has been linked to an increased risk of genetic abnormalities like Down Syndrome.  A second visit may be required if the baby is not in the correct position.  You will need to schedule an appointment with the Maternal Fetal Medicine office.  Address and phone number  below:  Please verify insurance coverage:  CPT code: 25599 (orozco) or 25865 (twins)  Diagnosis: Genetic screening- Z36.8A  Maternal Fetal Medicine  Dr. Mcdonald, Dr. Kaplan, Dr. Lee, and Dr. Dunn  [address] [place] [address] [address] [place] [address]  Phone [phone]  Fax [phone]    [2] Cell-Free DNA testing (NIPT)- This is a blood test done any time after 10-11 weeks which screens for genetic abnormalities like Down Syndrome.  It is greater than 98% sensitive but requires an amniocentesis for confirmation if abnormal.  It can also tell you the sex of the baby.  Please see the included pamphlet for more information about cost and billing options.  CPT code: 46616  Diagnosis code: Genetic screening- 36.8A    [3] Quad Screen (also called AFP-Plus or Tetra Screen)- This is a blood test done which screens for both genetic abnormalities like Down Syndrome and neural tube defects (NTD's), such as spina bifida (an abnormal opening in the spine which can cause paralysis).  It is usually performed around 16-20 weeks.  If the Quad screen comes back abnormal, it does not mean that your baby definitely has an abnormality.  It only means that there is an increased risk of an abnormality.  Additional testing such as a Level II ultrasound or amniocentesis may be recommended (see below).  This test is less accurate at picking up genetic abnormalities than the cell-free DNA test.  If you have test #1 or 2, then usually only the AFP part of the Quad screen would be performed to screen for NTD's (see below).    [4]  Alpha Fetoprotein (AFP, MSAFP)- This is a simple blood test that screens for neural tube defects such as spina bifida (an abnormal opening in the spine).  It is usually performed around 16-20 weeks.  Additional testing such as a Level II ultrasound may be recommended for an abnormal test result.  Please verify insurance coverage:  CPT code: 69285 Diagnosis code: Genetic Screening- Z36.8A    [5] Cystic Fibrosis/SMA  Carrier/Fragile X Carrier Screening- Cystic Fibrosis is a genetic disease which causes lung problems in the infant.  SMA (spinal muscular atrophy) is a genetic disease that affects the nerve cells of the spinal cord.  Fragile X is the most common cause of mental disabilities.  These genetic defects would need to be passed from both parents to the child.  A blood test is performed on the mother, and if her test is positive, then the father should also be tested. These tests can be done at any time in the pregnancy, usually in the first trimester with your initial OB labs.  All babies are screened for cystic fibrosis after birth.  Please verify insurance coverage:  Cystic Fibrosis CPT Code: 63020 Diagnosis code: Cystic Fibrosis screening- Z13.228  SMA CPT Code: 14461 Diagnosis code: Genetic Screening- Z36.8A or Testing for Genetic Disease Carrier- Z13.71    [6]  Level II Ultrasound-  This is an abdominal ultrasound done at approximately 20-21 weeks by a perinatology specialist (VALERIE) at [facility] which looks at many of the baby's internal structures.  Abnormalities in certain structures have been associated with an increased risk of genetic abnormalities.  It also screens for NTD's.  This ultrasound would replace the Level I Ultrasound that we normally perform at our office around the same time.    [7]  Amniocentesis-  During this procedure, a needle is passed through your abdominal wall to withdrawal some amniotic fluid from around the baby.  It screens for both genetic abnormalities and NTD's and is usually performed around 15-16 weeks.  This test has the highest accuracy for detecting genetic abnormalities, but there may be a small risk of miscarriage or other complications.  A similar procedure called Chorionic Villus Sampling (CVS) is performed by passing a catheter through the vagina to remove a small sample of tissue from the placenta (afterbirth).  CVS may have a higher risk of miscarriage and other rare  complications compared to amniocentesis but can be performed earlier in pregnancy.  Amniocentesis is often recommended/offered if any of the previously mentioned tests come back abnormal.  A pamphlet is available if you would like more information about this option.  If you decide to have an amniocentesis, the other tests would be unnecessary.      The above information covers some of the basics.  Pamphlets on the Cell-Free DNA test, Quad Screen and Cystic Fibrosis test should be in your prenatal packet.  Your doctor will discuss this information in more detail, and feel free to ask additional questions.  Also, remember that all of these tests are optional, and will only be performed at your request.  Testing that needs to be done through the perinatologist's office may require 2-3 weeks lead time to schedule.              PRENATAL INSTRUCTIONS    These prenatal instructions are specific to the care of our patients and supplement the books and pamphlets you are receiving.  The physicians and office staff are also available to answer questions not covered in this material.  We function as a group practice with physicians alternating call for deliveries.  For this reason you should see each of the doctors at least once during your pregnancy.  We provide continuous coverage of our patients and are committed to competent and concerned patient care.    PRENATAL CARE  Because of the importance of good prenatal care, you will have monthly office visits until the 28th week of pregnancy, then every two weeks for the next eight weeks and weekly during the final month.  Certain conditions may require more frequent visits.    MEDICATION  You will be given a prescription for prenatal vitamins with DHA to be taken until your six week postpartum visit or until you stop breastfeeding, whichever lasts longer.  These vitamins can occasionally exacerbate stomach irritation or constipation;  please contact us if you are having  difficulties tolerating your vitamin.  Should you become anemic during your pregnancy, you may receive a supplemental iron tablet to correct the condition.  Unnecessary medication should be avoided, but you may take Tylenol (Acetaminophen, regular or extra-strength) for ordinary headaches and Tums, Mylanta or Maalox for heartburn.  For cough or cold symptoms you may try Contac, Benadryl, Tylenol Sinus, Chlor-Trimeton, Actifed, Sudafed or plain Robitussin.  For constipation drink 6 to 8 glasses of water each day and increase your roughage intake with foods such as lettuce, bran, wheat bread and green vegetables.  You may take Metamucil, Colace or Senekot if the above do not work.  Please call the office to confirm the safety of any other prescription, over-the-counter or herbal medications.\Alcohol intake and tobacco should be avoided during pregnancy.  Caffeinated beverages (coffee, tea and soft drinks) should be limited to no more than 2 cups daily.  Pregnant women should consume the following each day through diet or supplements:  o Folic acid 400-800 micrograms   o Iron 30 mg (or be screened for anemia)  o Vitamin D 600 international units  o Calcium 1,000 mg    NUTRITION/HEALTH  Gaining weight is the easiest thing for a pregnant woman to do.  A well balanced diet of lean meat, fish, poultry, non-starchy vegetables, fruit and salads should accomplish an average weight gain of 2 to 4 pounds a month.  Avoid raw or undercooked fish or meats.  If you have deli meat, please microwave it or cook on the stove until steaming.  Soft cheeses (Camembert, Blue, Roquefort, Goat), all non-pasteurized foods, and large game fish (Shark, Swordfish, Keyur Mackerel, Tilefish, [place]) should be avoided, because of concerns about mercury exposure.  Other fish including canned light tuna should be limited to 2 servings per week.  Albacore (“white”) tuna should be limited to once per week.  Avoid cigarette smoking, alcohol, drugs, and  vaping.  If you have deli meat, please microwave it or cook on the stove until steaming.  Caffeine: Low-to-moderate caffeine intake in pregnancy does not appear to be associated with any adverse outcomes. Pregnant women may have caffeine but should probably limit it to less than 300 mg/d (a typical 8-ounce cup of brewed coffee has approximately 130 mg of caffeine. An 8-ounce cup of tea or 12-ounce soda has approximately 50 mg of caffeine), but exact amounts vary based on the specific beverage or food.  Avoid hot tub use.  Hair dye is presumed safe, but there are no randomized trials.  Insect Repellants: Topical insect repellants (including DEET) can be used in pregnancy and should be used in areas with high risk for insect-borne illnesses.  Recommended weight gain goals:   Pre-pregnancy BMI     Recommended Weight Gain   Underweight < 18.5     28-40 lbs   Normal 18.5 - 24.9     25-35 lbs   Overweight 25 - 29.9     15-25 lbs   Obese 30 or greater     11-20 lbs    PHYSICAL ACTIVITY  We encourage our patients to continue physical activities and exercise during their pregnancy.  We recommend activities such as swimming, stationary biking, walking, low-impact aerobics, tennis, and golf.  Other activities such as diving, bicycle riding, water/snow-skiing, horseback riding and flying in small aircraft should be avoided.  If you choose to exercise, your heart rate should not increase to more than 140 beats per minute or 70% of your maximum heart rate.  We recommend that you exercise for no more than 30 minutes at one time.  Saunas, Jacuzzis and tanning beds should be avoided.  Sexual intercourse is permitted in the absence of bleeding,  labor or any other complications of pregnancy.    IF YOU ARE INVOLVED IN A  MINOR CAR ACCIDENT OR HAVE A MINOR FALL, please contact our doctor on call at [phone].  Your phone call will transfer to the answering service if the office is closed.    [facility]  sponsors regular childbirth classes and we strongly recommend a class for both expectant parents.  Other popular classes include Breastfeeding, Infant CPR and Infant Care.  For more information go to [email or web address]--> Services-->Pregnancy and baby-->Tour, Classes and Events.      LABOR (at term, 37 weeks or more)  Weekdays, please call the Office at [phone].  At night and on weekends/holidays, please call Labor & Delivery at [phone].  If you have any of the following symptoms: contractions 5 to 6 minutes apart for one hour, or sooner if you have a history of fast labors, broken bag of water with or without contractions (gush or persistent slow leakage of fluid), any vaginal bleeding other than a slight bloody show following an internal exam or light blood-tinged mucous discharge, you should go directly to Labor & Delivery at [facility].  The Labor & Delivery staff will contact the doctor on call and notify him or her of your labor status. AVOID HEAVY MEALS IF YOU SUSPECT THE ONSET OF LABOR     LABOR (less than 37 weeks)  Please call the office at [phone] right away if you experience contractions more frequently than every 10 to 15 minutes for more than an hour, or any unusual low back pain or pelvic pressure.  Also use this number if you need to speak to the physician about any urgent medical problems after business hours.    HOSPITAL  The delivery of your baby will take place at [facility], located at [address] [place] [address].      PEDIATRICIAN  You will need to choose a pediatrician or family practitioner to take care of your baby both while you are in the hospital and after you go home.  If the physician you choose is not on staff at [facility], you will also need to select a physician to see the baby while you are in the hospital.  Please make sure that any physician you choose accepts your insurance plan and is accepting new patients.  Feel free to ask us for a  recommendation if you are not familiar with the doctors in this area.    DELIVERY  There are three types of anesthetics available for use during labor: short acting narcotics (Stadol, Nubain) given through your IV, local anesthetics injected to numb your perineum during delivery, and the epidural injection to numb you from the waist down during labor and delivery. The above choices are individualized, and we will work with you to make your labor experience as comfortable as possible.  Communication is extremely important during this process, so we encourage you to ask questions and discuss any concerns.  We believe childbirth is a natural process, and we hope to make your experience as wonderful as possible!   NAUSEA AND VOMITING IN PREGNANCY    This pamphlet contains some useful information if you are experiencing troublesome morning (or all day) sickness.  Morning sickness is the result of high hormone levels in pregnancy.  It usually starts around 5-7 weeks and starts to improve around 10-12 weeks.  Some suggestions on how to decrease your nausea are listed below, with simplest remedies listed first.    DIETARY SUGGESTIONS:  Every patient will have particular foods or drinks that really worsen the nausea.  Trial and error is the most important rule.  We recommend frequent, small meals.  Try eating agustin or saltine crackers, especially first thing in the morning.  Many patients find that spicy foods, greasy foods and foods with a strong odor are the worst.  To minimize odors, eat foods cold if possible and use a fan or open window in the kitchen (or avoid cooking altogether!).  Try drinking liquids ice cold in a plastic cup with a lid and straw.    HERBAL REMEDIES:  Seabands/relief bands/accupressure bands ([email or web address])  Tg 250 mg three times daily  Vitamin B6 10-25 mg three to four times daily, maximum 200 mg daily    OVER-THE-COUNTER MEDICATIONS (use only one of these at a time):  Dramamine  mg  up to four times daily  Unisom 12.5 mg up to four times daily  Benadryl 25 mg three to four times daily (may cause fatigue)    PRENATAL VITAMINS:  Prenatal vitamins can worsen nausea, primarily because they contain iron.  If you find that the prenatal vitamin is making things worse, there are several things you can try.  First, ask us for a different brand of vitamin.  Patients who have problems with one brand of vitamin often find that a different brand works better.  Second, try taking the vitamin at a different time of day, or splitting it in half and taking half in the morning and half at night.  Third, there is a \"mini-vitamin\" called Premesis that contains only folate, calcium and vitamin B6, which you can take until the nausea subsides.  You can also try taking two Flintstones vitamins daily if all else fails.    If you are unable to keep anything down, including liquids, for more than 24-36 hours, please contact our office.  Keep in mind that the baby's caloric requirements early in pregnancy are fairly small, and babies do a good job of getting all the nutrients they need from your body stores.    Finally, if none of the above suggestions help or you are losing significant amounts of weight (usually more than 7-10 pounds), there are several prescription medications that may also help.  Feel free to call us if you think this may be necessary.    As a final bit of reassurance, some studies have shown that pregnancies with severe morning sickness often have a better than usual outcome... Probably because a healthy pregnancy produces lots of hormones... And thus lots of nausea!    GOOD LUCK!

## 2025-06-17 NOTE — PROGRESS NOTES
Subjective:  Chief Complaint   Patient presents with    Prenatal Care     NOB  C/o mild cramping        34 year old  female    presents for new OB visit    Patient's last menstrual period was 2025 (exact date). Estimated Date of Delivery: 26   Patient complaining of mild cramping since finding out she is pregnant.  She is taking an OTC PNV with DHA.  She has no concerns regarding this pregnancy.  This is her first pregnancy.     OB History    Para Term  AB Living   1 0 0 0 0 0   SAB IAB Ectopic Multiple Live Births   0 0 0 0       # Outcome Date GA Lbr Hilario/2nd Weight Sex Type Anes PTL Lv   1 Current                Past Medical History[1]  Past Surgical History[2]    Medications:  Medications Ordered Prior to Encounter[3]    Allergies:  Allergies[4]    Social History:  Social History     Tobacco Use    Smoking status: Never    Smokeless tobacco: Never   Substance Use Topics    Alcohol use: Not Currently     Comment: Soc       Family History:  Family History[5]    Review of Systems:  Pertinent items are noted in HPI.    Objective:  /68   Pulse 101   Ht 66\"   Wt 153 lb (69.4 kg)   LMP 2025 (Exact Date)   BMI 24.69 kg/m²    Physical Examination:  General appearance: Well dressed, well nourished in no apparent distress  Neurologic/Psychiatric: Alert and oriented to person, place and time, mood normal, affect appropriate  Head: Normocephalic without obvious deformity, atraumatic  Neck: No thyromegaly, supple, non-tender, no masses, no adenopathy  Lungs: Clear to auscultation bilaterally, no rales, wheezes or rhonchi  Breasts: Symmetric, non-tender, no masses, lesions, retraction, dimpling or discharge bilaterally, no axillary or supraclavicular lymphadenopathy  Heart: Regular rate and rhythm, no gallops or murmurs  Abdomen: Soft, non-tender, non-distended, no masses, no hepatosplenomegaly, no hernias, no inguinal lymphadenopathy  Pelvic:    External genitalia- Normal,  Bartholin's, urethra, skeins glands normal   Vagina- No vaginal lesions, no discharge   Cervix- No lesions, long/closed, no cervical motion tenderness   Uterus- 11 week sized, non-tender, no masses   Adnexa-  Non-tender, no masses   Pelvimetry- Normal pelvimetry, normal arch  Extremities: Non-tender, full range of motion, no clubbing, cyanosis or edema  Skin:  General inspection- no rashes, lesions or discoloration      Assessment:  34 year old  EGA 11w2d EDER 2026  Amenorrhea, positive pregnancy test.  Addressed patients concerns regarding pregnancy.      Plan:  Pap smear/STD screen obtained.  Order for prenatal labs given.  Patient consents to HIV testing, counseled and cleared for release of HIV test results to patient.  Discussed optional prenatal screening tests including cfDNA, CF/carrier screen, NT/first trimester screen, Quad Screen/AFP, Level II ultrasound and amniocentesis/CVS.  Desires NIPT  Prenatal vitamins with DHA.  New OB education completed.  Vaccine recommendations reviewed including Pertussis 26-37 weeks, flu vaccine any trimester, Covid vaccine and booster.  Discussed diet, exercise, travel, food and medication safety.  Information sheets on prenatal screening tests, vaccine recommendations, nausea in pregnancy and prenatal care given.    Diagnoses and all orders for this visit:    11 weeks gestation of pregnancy (HCC)    VAIN I (vaginal intraepithelial neoplasia grade I)  -     ThinPrep PAP Smear; Future  -     Hpv Dna  High Risk , Thin Prep Collect; Future    Advanced maternal age, primigravida, antepartum (HCC)  -     Maternal Fetal Medicine Referral - [facility] Location  - BASA 12 weeks    Fibroadenoma of left breast  - to complete previously ordered US     screening encounter ([facility])  -     Type and screen; Future  -     CBC W Differential W Platelet; Future  -     Hepatitis B Surface Antigen; Future  -     T Pallidum Screening [place]; Future  -     Rubella, IGG;  Future  -     HIV AG AB COMBO; Future  -     Urine Culture, Routine; Future  -     HCV Antibody; Future  -     Varicella Zoster, IGG; Future  -     Rubeola(Measles)Antibodies, IGG-Immunity; Future  -     Chlamydia/Gc Amplification; Future    Screening for cervical cancer  -     ThinPrep PAP Smear; Future    Screening for human papillomavirus (HPV)  -     Hpv Dna  High Risk , Thin Prep Collect; Future    Encounter for  screening for other genetic defect (HCC)  -     [place] Panorama NIPS; Future        Return in about 4 weeks (around 7/15/2025) for SEBASTIAN.              [1]   Past Medical History:   Arrhythmia    tachycardia    Pap smear abnormality of cervix/human papillomavirus (HPV) positive    Sinus tachycardia    Visual impairment    contacts   [2]   Past Surgical History:  Procedure Laterality Date    Breast biopsy  3/31/16    benign    Colposcopy,bx cervix/endocerv curr  2018    Colposcopy,bx cervix/endocerv curr  2019    NL   [3]   Current Outpatient Medications on File Prior to Visit   Medication Sig Dispense Refill    MULTI PRENATAL 27-0.8 MG Oral Tab Take by mouth.       No current facility-administered medications on file prior to visit.   [4]   Allergies  Allergen Reactions    Codeine ITCHING   [5]   Family History  Problem Relation Age of Onset    Prostate Cancer Father     Cancer Paternal Grandfather         pancreatic    Heart Attack Paternal Grandfather     High Blood Pressure Paternal Grandfather     Breast Cancer Maternal Grandmother     Thyroid Disorder Maternal Grandmother     Osteoporosis Maternal Grandmother     High Cholesterol Maternal Grandmother     Heart Disorder Maternal Grandfather     Heart Attack Maternal Grandfather     High Cholesterol Maternal Grandfather     High Cholesterol Paternal Grandmother

## 2025-06-18 NOTE — TELEPHONE ENCOUNTER
Estimated Date of Delivery: 1/4/26- currently 11w3d    No blood type on file.  Patient desires NIPT testing- would like gender reported.  Order placed for Panorama.  Patient aware that [facility] will send her a link by text or email to schedule mobile phlebotomy and ship a kit to her home.  Advised to defer blood draw until after 12 weeks gestation to reduce the need for a repeat test.  Advised not to look at results on [facility] portal or in [employer] if she is not ready to see the gender.  Reminded patient to complete additional lab orders at any [place] lab location.  Patient verbalized understanding.         Order Number: [id number]

## 2025-06-18 NOTE — TELEPHONE ENCOUNTER
----- Message from Mary Sandoval sent at 6/17/2025  4:19 PM CDT -----  Regarding: NIPT  Pt desires Panorama NIPT testing  Thank you

## 2025-07-19 NOTE — PROGRESS NOTES
Chief Complaint   Patient presents with    Prenatal Care     SEBASTIAN      35 year old  at 15w6d who presents for routine OB visit without complaints. Doing well.   Patient denies any bleeding, leaking fluid, cramping, or contractions.    She did find a new lump in her left breast in the last week. She is scheduled for a left breast limited ultrasound for a history of fibroadenoma.    Assessment/Plan:   15w6d doing well.  Continue routine prenatal care  Patient had NIPT testing and has her Level 2 ultrasound scheduled. She is unsure about the MS AFP but will call if she would like it ordered.  Reviewed signs and symptoms of  labor, ruptured membranes    There is an area of dense tissue at the 2-3 o'clock position where she felt the lump, I will change the order to a complete breast ultrasound so they can evaluate the area.  Diagnoses and all orders for this visit:    Advanced maternal age, primigravida, antepartum ([facility])    Supervision of normal first pregnancy, antepartum ([facility])    Fibroadenoma of left breast  -     US BREAST LEFT COMPLETE (CPT=76641); Future    Mass of left breast, unspecified quadrant  -     US BREAST LEFT COMPLETE (CPT=76641); Future    SEBASTIAN 4 weeks    Subjective:   Review of Systems:  General: no complaints per category. See HPI for additional information.   Breast: no complaints per category. See HPI for additional information.   Respiratory: no complaints per category. See HPI for additional information.   Cardiovascular: no complaints per category. See HPI for additional information.   GI: no complaints per category. See HPI for additional information.   : no complaints per category. See HPI for additional information.   Heme: no complaints per category. See HPI for additional information.   Obstetrical History:   OB History    Para Term  AB Living   1 0 0 0 0 0   SAB IAB Ectopic Multiple Live Births   0 0 0 0       # Outcome Date GA Lbr Hilario/2nd Weight Sex Type Anes  PTL Lv   1 Current              Gynecological History: na  Medications:  Current Medications[1]   Allergies:  Allergies[2]  Past Medical History:  Past Medical History[3]  Past Surgical History:  Past Surgical History[4]  Immunizations:   Immunization History   Administered Date(s) Administered    DT 06/30/2003    DTP 08/28/1989, 10/06/1989, 11/17/1989, 02/13/1992, 08/17/1994    HEP A 07/05/2007    HEP B Vaccine 08/26/1999, 09/27/1999, 03/02/2000    HIB 06/27/1991    Hpv Virus Vaccine 9 [facility] 10/09/2018, 11/27/2019    MMR 10/16/1990, 08/17/1994    Meningococcal-Menactra 07/05/2007    OPV 08/28/1989, 10/06/1989, 06/27/1991, 08/17/1994    TDAP 08/31/2012    Tb Intradermal Test 09/04/2012, 09/11/2012    Tb Lakshmi Test 02/16/1990, 09/22/1993    Varicella 09/06/2012     Objective:     Vitals:    07/19/25 1014   BP: 112/70   Pulse: 96   Weight: 156 lb 9.6 oz (71 kg)     Body mass index is 25.28 kg/m².  Physical Examination:  General appearance: Well dressed, well nourished in no apparent distress  Neurologic/Psychiatric: Alert and oriented to person, place and time, mood normal, affect appropriate  Abdomen: Soft, non-tender, non-distended  Skin: General inspection- no rashes, lesions or discoloration  Patient Active Problem List    Diagnosis    AMA (advanced maternal age) primigravida 35+ (HCC)     [  ] NIPT  [  ] BASA  [  ] MFM Level 2      VAIN I (vaginal intraepithelial neoplasia grade I)     2012 with normal follow up.  Also had HOLLI 1.  Normal Pap 2023  [X] Check Pap with HPV       Inappropriate sinus node tachycardia (HCC)    Fibroadenoma of breast     Left.    [  ] Check follow up breast US      Breast pain       Total patient time was 20 minutes in reviewing paper/electronic records, reviewing test results from outside care provider, obtaining history, evaluating the patient, consultation, discussing treatment options, coordination of care and completing documentation. This included face to face and non-face to face  actions. The patient's questions and concerns were addressed.          [1]    BABY ASPIRIN OR Take by mouth.      MULTI PRENATAL 27-0.8 MG Oral Tab Take by mouth.     [2]   Allergies  Allergen Reactions    Codeine ITCHING   [3]   Past Medical History:   Arrhythmia    tachycardia    Pap smear abnormality of cervix/human papillomavirus (HPV) positive    Sinus tachycardia    Visual impairment    contacts   [4]   Past Surgical History:  Procedure Laterality Date    Breast biopsy  3/31/16    benign    Colposcopy,bx cervix/endocerv curr  03/27/2018    Colposcopy,bx cervix/endocerv curr  02/20/2019    NL